# Patient Record
Sex: MALE | Race: BLACK OR AFRICAN AMERICAN | Employment: FULL TIME | URBAN - METROPOLITAN AREA
[De-identification: names, ages, dates, MRNs, and addresses within clinical notes are randomized per-mention and may not be internally consistent; named-entity substitution may affect disease eponyms.]

---

## 2017-02-25 PROCEDURE — 86706 HEP B SURFACE ANTIBODY: CPT | Performed by: INTERNAL MEDICINE

## 2017-02-25 PROCEDURE — 87591 N.GONORRHOEAE DNA AMP PROB: CPT | Performed by: INTERNAL MEDICINE

## 2017-02-25 PROCEDURE — 86703 HIV-1/HIV-2 1 RESULT ANTBDY: CPT | Performed by: INTERNAL MEDICINE

## 2017-02-25 PROCEDURE — 87491 CHLMYD TRACH DNA AMP PROBE: CPT | Performed by: INTERNAL MEDICINE

## 2017-02-25 PROCEDURE — 86803 HEPATITIS C AB TEST: CPT | Performed by: INTERNAL MEDICINE

## 2017-02-25 PROCEDURE — 87340 HEPATITIS B SURFACE AG IA: CPT | Performed by: INTERNAL MEDICINE

## 2018-03-31 PROCEDURE — 86803 HEPATITIS C AB TEST: CPT | Performed by: INTERNAL MEDICINE

## 2018-03-31 PROCEDURE — 87591 N.GONORRHOEAE DNA AMP PROB: CPT | Performed by: INTERNAL MEDICINE

## 2018-03-31 PROCEDURE — 87340 HEPATITIS B SURFACE AG IA: CPT | Performed by: INTERNAL MEDICINE

## 2018-03-31 PROCEDURE — 87491 CHLMYD TRACH DNA AMP PROBE: CPT | Performed by: INTERNAL MEDICINE

## 2018-03-31 PROCEDURE — 87389 HIV-1 AG W/HIV-1&-2 AB AG IA: CPT | Performed by: INTERNAL MEDICINE

## 2018-03-31 PROCEDURE — 86706 HEP B SURFACE ANTIBODY: CPT | Performed by: INTERNAL MEDICINE

## 2019-03-14 PROCEDURE — 87389 HIV-1 AG W/HIV-1&-2 AB AG IA: CPT | Performed by: INTERNAL MEDICINE

## 2019-03-14 PROCEDURE — 86803 HEPATITIS C AB TEST: CPT | Performed by: INTERNAL MEDICINE

## 2019-03-14 PROCEDURE — 86706 HEP B SURFACE ANTIBODY: CPT | Performed by: INTERNAL MEDICINE

## 2024-02-19 ENCOUNTER — HOSPITAL ENCOUNTER (INPATIENT)
Facility: HOSPITAL | Age: 44
LOS: 4 days | Discharge: HOME | End: 2024-02-23
Attending: HOSPITALIST | Admitting: HOSPITALIST
Payer: COMMERCIAL

## 2024-02-19 PROBLEM — K57.92 DIVERTICULITIS: Status: ACTIVE | Noted: 2024-02-19

## 2024-02-19 RX ORDER — HYDROMORPHONE HYDROCHLORIDE 1 MG/ML
0.4 INJECTION, SOLUTION INTRAMUSCULAR; INTRAVENOUS; SUBCUTANEOUS EVERY 2 HOUR PRN
Status: DISCONTINUED | OUTPATIENT
Start: 2024-02-19 | End: 2024-02-23

## 2024-02-19 RX ORDER — ONDANSETRON 2 MG/ML
4 INJECTION INTRAMUSCULAR; INTRAVENOUS EVERY 6 HOURS PRN
Status: DISCONTINUED | OUTPATIENT
Start: 2024-02-19 | End: 2024-02-23

## 2024-02-19 RX ORDER — HYDROMORPHONE HYDROCHLORIDE 1 MG/ML
0.2 INJECTION, SOLUTION INTRAMUSCULAR; INTRAVENOUS; SUBCUTANEOUS EVERY 2 HOUR PRN
Status: DISCONTINUED | OUTPATIENT
Start: 2024-02-19 | End: 2024-02-23

## 2024-02-19 RX ORDER — ENOXAPARIN SODIUM 100 MG/ML
40 INJECTION SUBCUTANEOUS DAILY
Status: DISCONTINUED | OUTPATIENT
Start: 2024-02-20 | End: 2024-02-20

## 2024-02-19 RX ORDER — PROCHLORPERAZINE EDISYLATE 5 MG/ML
5 INJECTION INTRAMUSCULAR; INTRAVENOUS EVERY 8 HOURS PRN
Status: DISCONTINUED | OUTPATIENT
Start: 2024-02-19 | End: 2024-02-23

## 2024-02-19 RX ORDER — SODIUM CHLORIDE 9 MG/ML
INJECTION, SOLUTION INTRAVENOUS CONTINUOUS
Status: DISCONTINUED | OUTPATIENT
Start: 2024-02-19 | End: 2024-02-23

## 2024-02-19 RX ORDER — HYDROMORPHONE HYDROCHLORIDE 1 MG/ML
0.8 INJECTION, SOLUTION INTRAMUSCULAR; INTRAVENOUS; SUBCUTANEOUS EVERY 2 HOUR PRN
Status: DISCONTINUED | OUTPATIENT
Start: 2024-02-19 | End: 2024-02-23

## 2024-02-19 RX ORDER — ACETAMINOPHEN 500 MG
500 TABLET ORAL EVERY 4 HOURS PRN
Status: DISCONTINUED | OUTPATIENT
Start: 2024-02-19 | End: 2024-02-23

## 2024-02-20 ENCOUNTER — APPOINTMENT (OUTPATIENT)
Dept: CT IMAGING | Facility: HOSPITAL | Age: 44
End: 2024-02-20
Attending: PHYSICIAN ASSISTANT
Payer: COMMERCIAL

## 2024-02-20 LAB
ANION GAP SERPL CALC-SCNC: 5 MMOL/L (ref 0–18)
BASOPHILS # BLD AUTO: 0.06 X10(3) UL (ref 0–0.2)
BASOPHILS NFR BLD AUTO: 0.3 %
BUN BLD-MCNC: 9 MG/DL (ref 9–23)
C DIFF TOX B STL QL: NEGATIVE
CALCIUM BLD-MCNC: 9.1 MG/DL (ref 8.5–10.1)
CHLORIDE SERPL-SCNC: 104 MMOL/L (ref 98–112)
CO2 SERPL-SCNC: 27 MMOL/L (ref 21–32)
CREAT BLD-MCNC: 1.22 MG/DL
EGFRCR SERPLBLD CKD-EPI 2021: 76 ML/MIN/1.73M2 (ref 60–?)
EOSINOPHIL # BLD AUTO: 0.13 X10(3) UL (ref 0–0.7)
EOSINOPHIL NFR BLD AUTO: 0.7 %
ERYTHROCYTE [DISTWIDTH] IN BLOOD BY AUTOMATED COUNT: 13 %
GLUCOSE BLD-MCNC: 92 MG/DL (ref 70–99)
HCT VFR BLD AUTO: 38.8 %
HGB BLD-MCNC: 12.3 G/DL
IMM GRANULOCYTES # BLD AUTO: 0.1 X10(3) UL (ref 0–1)
IMM GRANULOCYTES NFR BLD: 0.5 %
LYMPHOCYTES # BLD AUTO: 1.49 X10(3) UL (ref 1–4)
LYMPHOCYTES NFR BLD AUTO: 7.9 %
MCH RBC QN AUTO: 28.3 PG (ref 26–34)
MCHC RBC AUTO-ENTMCNC: 31.7 G/DL (ref 31–37)
MCV RBC AUTO: 89.2 FL
MONOCYTES # BLD AUTO: 1.96 X10(3) UL (ref 0.1–1)
MONOCYTES NFR BLD AUTO: 10.5 %
NEUTROPHILS # BLD AUTO: 15.01 X10 (3) UL (ref 1.5–7.7)
NEUTROPHILS # BLD AUTO: 15.01 X10(3) UL (ref 1.5–7.7)
NEUTROPHILS NFR BLD AUTO: 80.1 %
OSMOLALITY SERPL CALC.SUM OF ELEC: 280 MOSM/KG (ref 275–295)
PLATELET # BLD AUTO: 411 10(3)UL (ref 150–450)
POTASSIUM SERPL-SCNC: 3.7 MMOL/L (ref 3.5–5.1)
RBC # BLD AUTO: 4.35 X10(6)UL
SODIUM SERPL-SCNC: 136 MMOL/L (ref 136–145)
WBC # BLD AUTO: 18.8 X10(3) UL (ref 4–11)

## 2024-02-20 PROCEDURE — 80048 BASIC METABOLIC PNL TOTAL CA: CPT | Performed by: HOSPITALIST

## 2024-02-20 PROCEDURE — 74176 CT ABD & PELVIS W/O CONTRAST: CPT | Performed by: PHYSICIAN ASSISTANT

## 2024-02-20 PROCEDURE — 85025 COMPLETE CBC W/AUTO DIFF WBC: CPT | Performed by: HOSPITALIST

## 2024-02-20 PROCEDURE — 87493 C DIFF AMPLIFIED PROBE: CPT | Performed by: HOSPITALIST

## 2024-02-20 NOTE — PAYOR COMM NOTE
--------------  ADMISSION REVIEW     Payor: ANIYAOLIVE PANTOJA  Subscriber #:  [id number]  Authorization Number: [id number]    Admit date: 2/19/24  Admit time:  9:04 PM       H&P/Consult note   History of Present Illness: Patient is a 43 year old male with no sig pmhx here for abd pain  Ongoing for about 2 weeks. Lower abd, / llq, worse with movement. Has felt sweaty and fevers. Has had some urinary urgency as well  Went to [facility] yest and found to have diverticulitis    /78 (BP Location: Right arm)   Pulse 68   Temp 98.9 °F (37.2 °C) (Oral)   Resp 19   Wt 202 lb 4.8 oz (91.8 kg)   SpO2 96%   BMI 28.06 kg/m²       Head:  Normocephalic, without obvious abnormality, atraumatic.   Eyes:  Sclera anicteric, No conjunctival pallor, EOMs intact.    Throat: MMM   Neck: Supple, symmetrical, trachea midline   Lungs:   Clear to auscultation bilaterally    Chest wall:  No tenderness or deformity.   Heart:  Regular rate and rhythm, no peripheral edema   Abdomen:   Soft, ttp in lower abd / LLQ, no guarding or rebound     Radiology: CT AP at [facility] 02/20/24  IMPRESSION:     1. Complicated mid-sigmoid diverticulitis, including an intramural/pericolonic abscess at this   level, as detailed above.     2. Small simple appearing right renal cyst.      BOWEL: Multiple diverticula reside within the sigmoid colon. There is short segment nodular wall   thickening of the mid sigmoid with extensive surrounding fat stranding. A peripherally enhancing and   centrally hypoattenuating 5.4 x 3.4 x 2.9 cm lesion is seen abutting the anterior margin of the mid   sigmoid, suggesting an intramural or adjacent/pericolonic abscess (volume of 27.8 mL). The findings   are consistent with complicated sigmoid diverticulitis.         ASSESSMENT / PLAN:   Patient is a 43 year old male with no sig pmhx here for abd pain     Impression  -acute complicated sigmoid diverticulitis with abscess formation  -leukocytosis     -microscopic hematuria - pt aware and  need for fu  -possible pulm nodule seen on CXR at [facility] - pt aware and need for fu     Plan     -iv abx, npo  -surgical consult  -may need IR drainage, hold lovenox   -ivf     -outpt ct chest to eval pulm nodule  -repeat UA as outpt, if blood persists may need  eval     Scds    Report of Consultation         Reason for Consultation:  Surgical Consultation      History of Present Illness:  Jason Chong is a a(n) 43 year old male. Patient complains of abdominal pain that began about 2 weeks ago, he initially thought he had food poisoning. His pain improved the following day. He does report that though shortly thereafter localized LLQ pain began described as a pressure. He does report throughout this period he has had constipation, also feeling some pressure with urination   CT scan obtained at [facility] where it describes acute diverticulitis with abscess, noted to be intramural v pericolonic   Patient admitted and general surgery consulted  Patient reports no h/o diverticulitis in the past, no c scope in the past    SKIN: anicteric  RESPIRATORY: non labored breathing  CARDIOVASCULAR: RRR  ABDOMEN: soft, fullness noted in LLQ with moderate tenderness extending to midline  LYMPHATIC: no lymphadenopathy  EXTREMITIES: no cyanosis, clubbing or edema  Lab 02/20/24  0607   WBC 18.8*   HGB 12.3*   MCV 89.2   .0      Lab 02/20/24  0607      K 3.7      CO2 27.0   BUN 9   CREATSERUM 1.22   GLU 92   CA 9.1    Impression:     42 y/o with acute diverticulitis with abscess  CT scan as noted above   T max 100.6   Leukocytosis  Abdomen is soft, fullness with moderate tenderness in LLQ extending to midline, no peritonitis      Plan:     Will proceed with conservative management,   -will need to consider repeat CT to verify if abscess in intramural, if intramural will not be able to have IR drain placement, - will discuss with Dr Marshall  -recommended bowel rest, NPO ice chips ok   -IV fluids  -IV antibiotics  -IV pain  medication prn  -zofran prn  Consult dietician to discuss diet recommendations for review of low fiber diet for home        MEDICATIONS ADMINISTERED IN LAST 1 DAY:  HYDROmorphone (Dilaudid) 1 MG/ML injection 0.4 mg       Date Action Dose Route User    2/20/2024 0718 Given 0.4 mg Intravenous Yadi Davies RN    2/19/2024 2325 Given 0.4 mg Intravenous Laura Garcia RN          piperacillin-tazobactam (Zosyn) 3.375 g in dextrose 5% 100 mL IVPB-ADDV       Date Action Dose Route User    2/20/2024 0841 New Bag 3.375 g Intravenous ValenzJossy nath RN    2/19/2024 2324 New Bag 3.375 g Intravenous Laura Garcia RN          sodium chloride 0.9% infusion       Date Action Dose Route User    2/19/2024 2323 New Bag (none) Intravenous Laura Garcia RN            Vitals (last day)       Date/Time Temp Pulse Resp BP SpO2 Weight O2 Device O2 Flow Rate (L/min) [facility]    02/20/24 1200 98.3 °F (36.8 °C) 70 18 141/74 98 % -- None (Room air) --     02/20/24 0830 98.2 °F (36.8 °C) 66 20 152/77 100 % -- None (Room air) --     02/20/24 0600 98.9 °F (37.2 °C) 68 19 142/78 96 % 202 lb 4.8 oz None (Room air) 0 L/min     02/19/24 2300 99.8 °F (37.7 °C) 69 20 145/74 98 % -- None (Room air) 0 L/min     02/19/24 2130 100.6 °F (38.1 °C) 75 20 156/79 99 % -- None (Room air) 0 L/min NN

## 2024-02-20 NOTE — PROGRESS NOTES
NURSING ADMISSION NOTE      Patient admitted via Ambulatory  Oriented to room 3613  Safety precautions initiated.  Bed in low position.  Call light in reach.    Patient direct admit from [facility]. Admission navigator completed. Girlfriend at bedside. Updated on plan of care. Will page MD for admission orders

## 2024-02-20 NOTE — PLAN OF CARE
A&Ox4, girlfriend at bedside. On room air, lungs clear. Abdomen soft and tender, BS active. Denies N&V. C/o abdominal pain- PRN Dilaudid given with positive result. IVF infusing to R AC PIV- 0.9 NS @ 100 mL/hr. Receiving IV Zosyn q8h for diverticulitis. Gen Sx to see patient in the morning. Remains NPO. Up ad pravin. Voiding with out difficulty. Needs met. Will monitor.    Problem: Patient/Family Goals  Goal: Patient/Family Long Term Goal  Description: Patient's Long Term Goal: to discharge    Interventions:  -   - See additional Care Plan goals for specific interventions  2/20/2024 0151 by Yadi Davies, RN  Outcome: Progressing     Problem: Patient/Family Goals  Goal: Patient/Family Short Term Goal  Description: Patient's Short Term Goal: for the diverticulitis to resolve    Interventions:   - Gen Sx to see, IVF, IV abx  - See additional Care Plan goals for specific interventions  2/20/2024 0151 by Yadi Davies, RN  Outcome: Progressing     Problem: PAIN - ADULT  Goal: Verbalizes/displays adequate comfort level or patient's stated pain goal  Description: INTERVENTIONS:  - Encourage pt to monitor pain and request assistance  - Assess pain using appropriate pain scale  - Administer analgesics based on type and severity of pain and evaluate response  - Implement non-pharmacological measures as appropriate and evaluate response  - Consider cultural and social influences on pain and pain management  - Manage/alleviate anxiety  - Utilize distraction and/or relaxation techniques  - Monitor for opioid side effects  - Notify MD/LIP if interventions unsuccessful or patient reports new pain  - Anticipate increased pain with activity and pre-medicate as appropriate  2/20/2024 0151 by Yadi Davies, RN  Outcome: Progressing

## 2024-02-20 NOTE — CONSULTS
[facility]  Report of Consultation    Jason Chong Patient Status:  Inpatient    1980 MRN [id number]   Location [facility] 3NE-A Attending Albert Andino MD   Hosp Day # 1 PCP Sonja Soto MD     24    Reason for Consultation:    Surgical Consultation     CC: abdominal pain      History of Present Illness:    Jason Chong is a a(n) 43 year old male. Patient complains of abdominal pain that began about 2 weeks ago, he initially thought he had food poisoning. His pain improved the following day. He does report that though shortly thereafter localized LLQ pain began described as a pressure. He does report throughout this period he has had constipation, also feeling some pressure with urination   CT scan obtained at [facility] where it describes acute diverticulitis with abscess, noted to be intramural v pericolonic   Patient admitted and general surgery consulted  Patient reports no h/o diverticulitis in the past, no c scope in the past  Denies any previous abdominal surgery     Past Medical History:    History reviewed. No pertinent past medical history.    Past Surgical History:    History reviewed. No pertinent surgical history.    Family History:    History reviewed. No pertinent family history.    Social History:     reports that he has never smoked. He has never used smokeless tobacco. He reports current alcohol use. He reports that he does not use drugs.    Allergies:    No Known Allergies    Current Medications:      Current Facility-Administered Medications:     sodium chloride 0.9% infusion, , Intravenous, Continuous    acetaminophen (Tylenol Extra Strength) tab 500 mg, 500 mg, Oral, Q4H PRN    ondansetron (Zofran) 4 MG/2ML injection 4 mg, 4 mg, Intravenous, Q6H PRN    prochlorperazine (Compazine) 10 MG/2ML injection 5 mg, 5 mg, Intravenous, Q8H PRN    HYDROmorphone (Dilaudid) 1 MG/ML injection 0.2 mg, 0.2 mg, Intravenous, Q2H PRN **OR** HYDROmorphone (Dilaudid) 1 MG/ML injection 0.4  mg, 0.4 mg, Intravenous, Q2H PRN **OR** HYDROmorphone (Dilaudid) 1 MG/ML injection 0.8 mg, 0.8 mg, Intravenous, Q2H PRN    piperacillin-tazobactam (Zosyn) 3.375 g in dextrose 5% 100 mL IVPB-ADDV, 3.375 g, Intravenous, Q8H    Home Medications:    No current facility-administered medications on file prior to encounter.     No current outpatient medications on file prior to encounter.       Review of Systems:    10 point review performed pertinent positives and negatives per history of present illness.    Physical Exam:    Blood pressure 152/77, pulse 66, temperature 98.2 °F (36.8 °C), temperature source Oral, resp. rate 20, weight 202 lb 3.2 oz (91.7 kg), SpO2 100%.    GENERAL: Alert and oriented x 3, well developed, well nourished male, in no apparent distress    SKIN: anicteric    RESPIRATORY: non labored breathing    CARDIOVASCULAR: RRR    ABDOMEN: soft, fullness noted in LLQ with moderate tenderness extending to midline    LYMPHATIC: no lymphadenopathy    EXTREMITIES: no cyanosis, clubbing or edema    .    Laboratory Data:  Recent Labs   Lab 02/20/24  0607   WBC 18.8*   HGB 12.3*   MCV 89.2   .0       Recent Labs   Lab 02/20/24  0607      K 3.7      CO2 27.0   BUN 9   CREATSERUM 1.22   GLU 92   CA 9.1       No results for input(s): \"ALT\", \"AST\", \"ALB\", \"AMYLASE\", \"LIPASE\", \"LDH\" in the last 168 hours.    Invalid input(s): \"ALPHOS\", \"TBIL\", \"DBIL\", \"TPROT\"    No results for input(s): \"TROP\" in the last 168 hours.          Radiology:  Procedure Note    Abraham Lopez DO - 02/19/2024  Formatting of this note might be different from the original.  DATE OF SERVICE: 02.19.2024  CT ABDOMEN AND PELVIS WITH IV CONTRAST    CLINICAL INDICATION: None.    COMPARISON STUDY: None available.    TECHNIQUE:  Multiple axial images of the abdomen and pelvis were performed from the lung bases  through the pubic symphysis after the injection of nonionic intravenous contrast. Oral contrast was  used for the entire  exam.  80 mL of Isovue 370 were administered intravenously.    ADVERSE REACTION: None    Automated exposure control and ALARA manual techniques for patient specific dose reduction were  followed while maintaining the necessary diagnostic image quality.    FINDINGS:  LUNG BASES: No consolidations in the visualized lungs. No pleural/pericardial effusions.    LIVER: Homogeneous parenchyma without cystic or solid lesions. No intrahepatic biliary ductal  dilatation.    GALLBLADDER: Normally distended without wall thickening or pericholecystic fluid/fat stranding.    BILIARY TREE: No radiographic evidence of extrahepatic biliary ductal dilatation.    PANCREAS: No pancreatic ductal dilatation or peripancreatic fluid/fat stranding.    SPLEEN: Unremarkable.    ADRENAL GLANDS: Unremarkable.    KIDNEYS: Demonstrate symmetric and bilateral nephrograms without hydronephrosis or perinephric  collections.    A well-defined hypoattenuating and nonenhancing 1.3 cm midpole cortical lesion of the right kidney  is most likely a simple cyst (Bosniak class I cyst).    ABDOMINAL AORTA: Normal caliber without aneurysmal dilatations.    MESENTERY/RETROPERITONEUM: No enlarged mesenteric/retroperitoneal lymph nodes.    BOWEL: Multiple diverticula reside within the sigmoid colon. There is short segment nodular wall  thickening of the mid sigmoid with extensive surrounding fat stranding. A peripherally enhancing and  centrally hypoattenuating 5.4 x 3.4 x 2.9 cm lesion is seen abutting the anterior margin of the mid  sigmoid, suggesting an intramural or adjacent/pericolonic abscess (volume of 27.8 mL). The findings  are consistent with complicated sigmoid diverticulitis.    An approximately 18 x 9 mm fluid-containing structure posterior to the gastric fundus represents a  gastric diverticulum.    No radiographic evidence of bowel obstruction or other wall thickening.    There is no pneumoperitoneum or significant free fluid.    URINARY BLADDER:  No bladder wall thickening. No perivesical fat stranding.    PELVIC LYMPH NODES: No pelvic/inguinal lymphadenopathy.    PELVIS OTHER: No pelvic mass lesions or loculated collections are present.    OSSEOUS STRUCTURES: No significant focal osteolytic or osteoblastic lesions.    =====  IMPRESSION:    1. Complicated mid-sigmoid diverticulitis, including an intramural/pericolonic abscess at this  level, as detailed above.    2. Small simple appearing right renal cyst.  Exam End: 02/19/24 18:12    Specimen Collected: 02/19/24 18:14 Last Resulted: 02/19/24 18:19       Problem List:    Patient Active Problem List   Diagnosis    Diverticulitis       Impression:    42 y/o with acute diverticulitis with abscess  CT scan as noted above   T max 100.6   Leukocytosis  Abdomen is soft, fullness with moderate tenderness in LLQ extending to midline, no peritonitis     Plan:    Will proceed with conservative management,   -will need to consider repeat CT to verify if abscess in intramural, if intramural will not be able to have IR drain placement, - will discuss with Dr Marshall  -recommended bowel rest, NPO ice chips ok   -IV fluids  -IV antibiotics  -IV pain medication prn  -zofran prn  Consult dietician to discuss diet recommendations for review of low fiber diet for home      REMIGIO Cabral  [facility]  General Surgery  2/20/2024    Addendum  Agree as above  Patient was seen and examined with Bonita  Will obtain CT abdomen and pelvis with oral contrast to further evaluate the possible intramural versus pericolonic abscess.  If intramural abscess then will treat with 2 weeks of oral antibiotics but if pericolonic abscess then will consult IR for possible aspiration.  No acute surgical issues at this time  Ok to have clear liquid diet after the CT scan  Voiced understanding

## 2024-02-20 NOTE — PLAN OF CARE
Pt alert and oriented x 4   Assumed pt care at 0730  RA  Tele-NSR  PRN Dilaudid for pain  NPO   Gen/Surg consult   PIV Right AC with NS at 100ml/hr    Problem: PAIN - ADULT  Goal: Verbalizes/displays adequate comfort level or patient's stated pain goal  Description: INTERVENTIONS:  - Encourage pt to monitor pain and request assistance  - Assess pain using appropriate pain scale  - Administer analgesics based on type and severity of pain and evaluate response  - Implement non-pharmacological measures as appropriate and evaluate response  - Consider cultural and social influences on pain and pain management  - Manage/alleviate anxiety  - Utilize distraction and/or relaxation techniques  - Monitor for opioid side effects  - Notify MD/LIP if interventions unsuccessful or patient reports new pain  - Anticipate increased pain with activity and pre-medicate as appropriate  Outcome: Progressing     Problem: RISK FOR INFECTION - ADULT  Goal: Absence of fever/infection during anticipated neutropenic period  Description: INTERVENTIONS  - Monitor WBC  - Administer growth factors as ordered  - Implement neutropenic guidelines  Outcome: Progressing     Problem: DISCHARGE PLANNING  Goal: Discharge to home or other facility with appropriate resources  Description: INTERVENTIONS:  - Identify barriers to discharge w/pt and caregiver  - Include patient/family/discharge partner in discharge planning  - Arrange for needed discharge resources and transportation as appropriate  - Identify discharge learning needs (meds, wound care, etc)  - Arrange for interpreters to assist at discharge as needed  - Consider post-discharge preferences of patient/family/discharge partner  - Complete POLST form as appropriate  - Assess patient's ability to be responsible for managing their own health  - Refer to Case Management Department for coordinating discharge planning if the patient needs post-hospital services based on physician/LIP order or complex  needs related to functional status, cognitive ability or social support system  Outcome: Progressing

## 2024-02-20 NOTE — H&P
[facility] Hospitalist H&P/Consult note       CC: abd pain     PCP: Sonja Soto MD    History of Present Illness: Patient is a 43 year old male with no sig pmhx here for abd pain  Ongoing for about 2 weeks. Lower abd, / llq, worse with movement. Has felt sweaty and fevers. Has had some urinary urgency as well  Went to [facility] yest and found to have diverticulitis  No similar symptoms in the past         PMH  none    PSH  none    ALL:  No Known Allergies     Home Medications:  none        Soc Hx  Social History     Tobacco Use    Smoking status: Never    Smokeless tobacco: Never   Substance Use Topics    Alcohol use: Yes     Comment: 2-4 times per month        Fam Hx  none    Review of Systems  Comprehensive ROS reviewed and negative except for what's stated above.  Including negative for chest pain, shortness of breath, syncope.       OBJECTIVE:  /78 (BP Location: Right arm)   Pulse 68   Temp 98.9 °F (37.2 °C) (Oral)   Resp 19   Wt 202 lb 4.8 oz (91.8 kg)   SpO2 96%   BMI 28.06 kg/m²   General:  Alert, no distress, appears stated age.   Head:  Normocephalic, without obvious abnormality, atraumatic.   Eyes:  Sclera anicteric, No conjunctival pallor, EOMs intact.    Throat: MMM   Neck: Supple, symmetrical, trachea midline   Lungs:   Clear to auscultation bilaterally    Chest wall:  No tenderness or deformity.   Heart:  Regular rate and rhythm, no peripheral edema   Abdomen:   Soft, ttp in lower abd / LLQ, no guarding or rebound   Extremities: Atraumatic, no cyanosis or edema.   Skin: No visible rashes or lesions.    Neurologic: Normal strength, no focal deficit appreciated     Diagnostic Data:    CBC/Chem  No results for input(s): \"WBC\", \"HGB\", \"MCV\", \"PLT\", \"BAND\", \"INR\" in the last 168 hours.    Invalid input(s): \"LYM#\", \"MONO#\", \"BASOS#\", \"EOSIN#\"    No results for input(s): \"NA\", \"K\", \"CL\", \"CO2\", \"BUN\", \"CREATSERUM\", \"GLU\", \"CA\", \"CAION\", \"MG\", \"PHOS\" in the last 168 hours.    No results for input(s):  \"ALT\", \"AST\", \"ALB\", \"AMYLASE\", \"LIPASE\", \"LDH\" in the last 168 hours.    Invalid input(s): \"ALPHOS\", \"TBIL\", \"DBIL\", \"TPROT\"    No results for input(s): \"TROP\" in the last 168 hours.       Radiology: CT AP at [facility] 02/20/24  IMPRESSION:     1. Complicated mid-sigmoid diverticulitis, including an intramural/pericolonic abscess at this   level, as detailed above.     2. Small simple appearing right renal cyst.     BOWEL: Multiple diverticula reside within the sigmoid colon. There is short segment nodular wall   thickening of the mid sigmoid with extensive surrounding fat stranding. A peripherally enhancing and   centrally hypoattenuating 5.4 x 3.4 x 2.9 cm lesion is seen abutting the anterior margin of the mid   sigmoid, suggesting an intramural or adjacent/pericolonic abscess (volume of 27.8 mL). The findings   are consistent with complicated sigmoid diverticulitis.       ASSESSMENT / PLAN:     Patient is a 43 year old male with no sig pmhx here for abd pain    Impression    -acute complicated sigmoid diverticulitis with abscess formation  -leukocytosis    -microscopic hematuria - pt aware and need for fu  -possible pulm nodule seen on CXR at [facility] - pt aware and need for fu    Plan    -iv abx, npo  -surgical consult  -may need IR drainage, hold lovenox   -ivf    -outpt ct chest to eval pulm nodule  -repeat UA as outpt, if blood persists may need  eval    scds    Further recommendations pending patient's clinical course. [facility] hospitalist to continue to follow patient while in house    Patient and/or patient's family given opportunity to ask questions and note understanding and agreeing with therapeutic plan as outlined    Aron Gotti Hospitalist  [phone]  Answering Service: [phone]

## 2024-02-20 NOTE — PROGRESS NOTES
02/19/24 2240   Provider Notification   Reason for Communication New consult   Provider Name Other (comment)  (Natalie Marshall (Gen Sx))   Method of Communication Page   Response No new orders   Notification Time 2240

## 2024-02-21 LAB
ALBUMIN SERPL-MCNC: 2.8 G/DL (ref 3.4–5)
ALBUMIN/GLOB SERPL: 0.6 {RATIO} (ref 1–2)
ALP LIVER SERPL-CCNC: 67 U/L
ALT SERPL-CCNC: 15 U/L
ANION GAP SERPL CALC-SCNC: 5 MMOL/L (ref 0–18)
AST SERPL-CCNC: 7 U/L (ref 15–37)
BILIRUB SERPL-MCNC: 0.6 MG/DL (ref 0.1–2)
BILIRUB UR QL STRIP.AUTO: NEGATIVE
BUN BLD-MCNC: 8 MG/DL (ref 9–23)
CALCIUM BLD-MCNC: 8.8 MG/DL (ref 8.5–10.1)
CHLORIDE SERPL-SCNC: 106 MMOL/L (ref 98–112)
CLARITY UR REFRACT.AUTO: CLEAR
CO2 SERPL-SCNC: 27 MMOL/L (ref 21–32)
COLOR UR AUTO: YELLOW
CREAT BLD-MCNC: 1.14 MG/DL
EGFRCR SERPLBLD CKD-EPI 2021: 83 ML/MIN/1.73M2 (ref 60–?)
ERYTHROCYTE [DISTWIDTH] IN BLOOD BY AUTOMATED COUNT: 12.9 %
GLOBULIN PLAS-MCNC: 4.4 G/DL (ref 2.8–4.4)
GLUCOSE BLD-MCNC: 85 MG/DL (ref 70–99)
GLUCOSE UR STRIP.AUTO-MCNC: NORMAL MG/DL
HCT VFR BLD AUTO: 37.6 %
HGB BLD-MCNC: 12.5 G/DL
KETONES UR STRIP.AUTO-MCNC: 40 MG/DL
LEUKOCYTE ESTERASE UR QL STRIP.AUTO: NEGATIVE
MAGNESIUM SERPL-MCNC: 2.2 MG/DL (ref 1.6–2.6)
MCH RBC QN AUTO: 28.6 PG (ref 26–34)
MCHC RBC AUTO-ENTMCNC: 33.2 G/DL (ref 31–37)
MCV RBC AUTO: 86 FL
NITRITE UR QL STRIP.AUTO: NEGATIVE
OSMOLALITY SERPL CALC.SUM OF ELEC: 284 MOSM/KG (ref 275–295)
PH UR STRIP.AUTO: 6.5 [PH] (ref 5–8)
PLATELET # BLD AUTO: 410 10(3)UL (ref 150–450)
POTASSIUM SERPL-SCNC: 3.8 MMOL/L (ref 3.5–5.1)
PROT SERPL-MCNC: 7.2 G/DL (ref 6.4–8.2)
PROT UR STRIP.AUTO-MCNC: 30 MG/DL
RBC # BLD AUTO: 4.37 X10(6)UL
RBC #/AREA URNS AUTO: >10 /HPF
SODIUM SERPL-SCNC: 138 MMOL/L (ref 136–145)
SP GR UR STRIP.AUTO: 1.02 (ref 1–1.03)
UROBILINOGEN UR STRIP.AUTO-MCNC: 8 MG/DL
WBC # BLD AUTO: 17.9 X10(3) UL (ref 4–11)

## 2024-02-21 PROCEDURE — 83735 ASSAY OF MAGNESIUM: CPT | Performed by: HOSPITALIST

## 2024-02-21 PROCEDURE — 80053 COMPREHEN METABOLIC PANEL: CPT | Performed by: HOSPITALIST

## 2024-02-21 PROCEDURE — 85027 COMPLETE CBC AUTOMATED: CPT | Performed by: HOSPITALIST

## 2024-02-21 PROCEDURE — 81001 URINALYSIS AUTO W/SCOPE: CPT | Performed by: HOSPITALIST

## 2024-02-21 RX ORDER — HYDROCODONE BITARTRATE AND ACETAMINOPHEN 5; 325 MG/1; MG/1
1 TABLET ORAL EVERY 4 HOURS PRN
Status: DISCONTINUED | OUTPATIENT
Start: 2024-02-21 | End: 2024-02-23

## 2024-02-21 RX ORDER — ENOXAPARIN SODIUM 100 MG/ML
40 INJECTION SUBCUTANEOUS NIGHTLY
Status: DISCONTINUED | OUTPATIENT
Start: 2024-02-21 | End: 2024-02-23

## 2024-02-21 NOTE — PROGRESS NOTES
CC: follow-up hospital admission diverticulitis    SUBJECTIVE:  Interval History:     Pain is better  Tolerating diet    States he had hematuria earlier today, asked to show it to staff next time  Dw RN, states urine was yellow this am    OBJECTIVE:  Scheduled Meds:    piperacillin-tazobactam  3.375 g Intravenous Q8H     Continuous Infusions:    sodium chloride 100 mL/hr at 02/21/24 0600     PRN Meds: HYDROcodone-acetaminophen, acetaminophen, ondansetron, prochlorperazine, HYDROmorphone **OR** HYDROmorphone **OR** HYDROmorphone    PHYSICAL EXAM  Vital signs: Temp:  [98.7 °F (37.1 °C)-99.4 °F (37.4 °C)] 99 °F (37.2 °C)  Pulse:  [63-75] 64  Resp:  [16-20] 18  BP: (115-135)/(64-82) 115/64  SpO2:  [95 %-98 %] 95 %      GENERAL - NAD, AAO  EYES- sclera anicteric   HENT- normocephalic, OP - MMM  NECK - no JVD  CV- RRR  RESP - CTAB, normal resp effort  ABDOMEN- soft, ttp in left abd better  EXT- no LE edema   PSYCH - normal mentation/ normal affect    Data Review:   Labs:   Recent Labs   Lab 02/20/24  0607 02/21/24  0540   WBC 18.8* 17.9*   HGB 12.3* 12.5*   MCV 89.2 86.0   .0 410.0       Recent Labs   Lab 02/20/24  0607 02/21/24  0540    138   K 3.7 3.8    106   CO2 27.0 27.0   BUN 9 8*   CREATSERUM 1.22 1.14   CA 9.1 8.8   MG  --  2.2   GLU 92 85       Recent Labs   Lab 02/21/24  0540   ALT 15*   AST 7*   ALB 2.8*       No results for input(s): \"PGLU\" in the last 168 hours.        ASSESSMENT/PLAN:    Patient is a 43 year old male with no sig pmhx here for abd pain     Impression     -acute complicated sigmoid diverticulitis with abscess formation  -leukocytosis     -microscopic hematuria - pt aware and need for fu  -possible pulm nodule seen on CXR at [facility] - pt aware and need for fu     Plan     -iv abx, IVF  -surgical consult  -dw GS, abscess appears intramural, unable to drain  -started on CLD  Add norco   repeat UA here    -outpt ct chest to eval pulm nodule  -repeat UA as outpt, if blood persists  may need  eval     Scds        Will continue to follow while hospitalized. Please page me or the on-call hospitalist with questions or concerns.    Aron Michaud Hospitalist  [phone]  Answering Service: [phone]

## 2024-02-21 NOTE — PROGRESS NOTES
[facility]  Progress Note    Jason Chong Patient Status:  Inpatient    1980 MRN [id number]   Location [facility] 3NE-A Attending Aron Holcomb,    Hosp Day # 2 PCP Sonja Soto MD     Subjective:    Patient reports feeling improved pain   CT scan reviewed with patient diverticulitis of sigmoid colon with phlegmon     Objective/Physical Exam:    Vital Signs:  Blood pressure 115/64, pulse 64, temperature 99 °F (37.2 °C), temperature source Oral, resp. rate 18, weight 202 lb 3.2 oz (91.7 kg), SpO2 95%.    General:  Alert, orientated x3.  Cooperative.  No apparent distress.    Lungs:  Non labored breathing    Cardiac:  Regular rate and rhythm.     Abdomen:  soft, fullness in LLQ with mild tenderness improved from yesterday     Extremities:  No lower extremity edema noted.  Without clubbing or cyanosis.        Labs:  Reviewed    Lab Results   Component Value Date    WBC 17.9 2024    HGB 12.5 2024    HCT 37.6 2024    .0 2024    CREATSERUM 1.14 2024    BUN 8 2024     2024    K 3.8 2024     2024    CO2 27.0 2024    GLU 85 2024    CA 8.8 2024    ALB 2.8 2024    ALKPHO 67 2024    BILT 0.6 2024    TP 7.2 2024    AST 7 2024    ALT 15 2024    MG 2.2 2024       Xray:  Reviewed    Problem List:  Patient Active Problem List   Diagnosis    Diverticulitis           Impression:     42 y/o with acute diverticulitis   Leukocytosis  Mild improvement in LLQ pain    Plan:    Will start clears today  Encourage ambulation/IS  Continue IV antibiotics  All questions answered  Repeat labs tomorrow  Patient seen and examined with REMIGIO Guallpa  [place]  General Surgery  2024

## 2024-02-21 NOTE — DIETARY NOTE
[facility]   CLINICAL NUTRITION    Jason Cohng     Admitting diagnosis:  Diverticulitis with abscess  Diverticulitis    Ht:  5'11\"  Wt: 91.7 kg (202 lb 3.2 oz).   Body mass index is 28.04 kg/m².  IBW: 78.2 kg    Wt Readings from Last 6 Encounters:   02/20/24 91.7 kg (202 lb 3.2 oz)   03/19/22 99.5 kg (219 lb 6.4 oz)   04/10/21 94.3 kg (208 lb)   06/06/20 98.4 kg (217 lb)   03/30/20 100.2 kg (221 lb)   03/14/19 93.9 kg (207 lb)        Labs/Meds reviewed    Diet:       Procedures    Clear liquid diet Is Patient on Accuchecks? No     Percent Meals Eaten (last 3 days)       None            Pt chart reviewed d/t MD Consult.  Patient reports fair appetite at this time.  No intake yet this admission - pt has been NPO  No current diarrhea, emesis, or constipation.   No significant weight changes noted.     PMH is noncontributory.  Pt seen per MD consult for Diverticulitis Diet. Pt NPO at time of visit. + Abdominal pain, thirsty.  No n/v.  Reviewed Diverticulitis diet including low fiber advancing eventually to high fiber.  All questions answered at time of visit.  Handout with contact info provided for questions post discharge.    Patient is at low nutrition risk at this time.    Please consult if patient status changes or nutrition issues arise.    Yadi Paniagua RD, LDN, [facility]  Clinical Dietitian  Phone [phone]

## 2024-02-21 NOTE — PLAN OF CARE
A&Ox4. On room air. Abdomen soft and nontender, BS active. Denies N&V. C/o abdominal pain- PRN Dilaudid given with positive result. IVF infusing to R AC PIV- 0.9 NS @ 100 mL/hr. Receiving IV Zosyn q8h for diverticulitis. Gen Sx following case. Up ad pravin. Voiding with out difficulty. Needs met. Will monitor     Problem: Patient/Family Goals  Goal: Patient/Family Long Term Goal  Description: Patient's Long Term Goal: to discharge    Interventions:  -   - See additional Care Plan goals for specific interventions  Outcome: Progressing  Goal: Patient/Family Short Term Goal  Description: Patient's Short Term Goal: for the diverticulitis to resolve    Interventions:   - Gen Sx to see, IVF, IV abx  - See additional Care Plan goals for specific interventions  Outcome: Progressing     Problem: PAIN - ADULT  Goal: Verbalizes/displays adequate comfort level or patient's stated pain goal  Description: INTERVENTIONS:  - Encourage pt to monitor pain and request assistance  - Assess pain using appropriate pain scale  - Administer analgesics based on type and severity of pain and evaluate response  - Implement non-pharmacological measures as appropriate and evaluate response  - Consider cultural and social influences on pain and pain management  - Manage/alleviate anxiety  - Utilize distraction and/or relaxation techniques  - Monitor for opioid side effects  - Notify MD/LIP if interventions unsuccessful or patient reports new pain  - Anticipate increased pain with activity and pre-medicate as appropriate  Outcome: Progressing     Problem: RISK FOR INFECTION - ADULT  Goal: Absence of fever/infection during anticipated neutropenic period  Description: INTERVENTIONS  - Monitor WBC  - Administer growth factors as ordered  - Implement neutropenic guidelines  Outcome: Progressing     Problem: DISCHARGE PLANNING  Goal: Discharge to home or other facility with appropriate resources  Description: INTERVENTIONS:  - Identify barriers to  discharge w/pt and caregiver  - Include patient/family/discharge partner in discharge planning  - Arrange for needed discharge resources and transportation as appropriate  - Identify discharge learning needs (meds, wound care, etc)  - Arrange for interpreters to assist at discharge as needed  - Consider post-discharge preferences of patient/family/discharge partner  - Complete POLST form as appropriate  - Assess patient's ability to be responsible for managing their own health  - Refer to Case Management Department for coordinating discharge planning if the patient needs post-hospital services based on physician/LIP order or complex needs related to functional status, cognitive ability or social support system  Outcome: Progressing

## 2024-02-21 NOTE — PLAN OF CARE
Patient alert and oriented x4.  On RA.  NSR on tele.  HR 70s.  Denies pain at this time.  IVF.  IV zosyn.  NPO at this time.  Resting comfortably in bed.

## 2024-02-22 LAB
ALBUMIN SERPL-MCNC: 2.6 G/DL (ref 3.4–5)
ALBUMIN/GLOB SERPL: 0.6 {RATIO} (ref 1–2)
ALP LIVER SERPL-CCNC: 55 U/L
ALT SERPL-CCNC: 15 U/L
ANION GAP SERPL CALC-SCNC: 5 MMOL/L (ref 0–18)
AST SERPL-CCNC: 14 U/L (ref 15–37)
BILIRUB SERPL-MCNC: 0.5 MG/DL (ref 0.1–2)
BUN BLD-MCNC: 5 MG/DL (ref 9–23)
CALCIUM BLD-MCNC: 8.8 MG/DL (ref 8.5–10.1)
CHLORIDE SERPL-SCNC: 107 MMOL/L (ref 98–112)
CO2 SERPL-SCNC: 27 MMOL/L (ref 21–32)
CREAT BLD-MCNC: 0.9 MG/DL
EGFRCR SERPLBLD CKD-EPI 2021: 110 ML/MIN/1.73M2 (ref 60–?)
ERYTHROCYTE [DISTWIDTH] IN BLOOD BY AUTOMATED COUNT: 12.8 %
GLOBULIN PLAS-MCNC: 4.3 G/DL (ref 2.8–4.4)
GLUCOSE BLD-MCNC: 96 MG/DL (ref 70–99)
HCT VFR BLD AUTO: 36.6 %
HGB BLD-MCNC: 12.4 G/DL
MAGNESIUM SERPL-MCNC: 2.1 MG/DL (ref 1.6–2.6)
MCH RBC QN AUTO: 28.8 PG (ref 26–34)
MCHC RBC AUTO-ENTMCNC: 33.9 G/DL (ref 31–37)
MCV RBC AUTO: 85.1 FL
OSMOLALITY SERPL CALC.SUM OF ELEC: 285 MOSM/KG (ref 275–295)
PLATELET # BLD AUTO: 420 10(3)UL (ref 150–450)
POTASSIUM SERPL-SCNC: 3.7 MMOL/L (ref 3.5–5.1)
PROT SERPL-MCNC: 6.9 G/DL (ref 6.4–8.2)
RBC # BLD AUTO: 4.3 X10(6)UL
SODIUM SERPL-SCNC: 139 MMOL/L (ref 136–145)
WBC # BLD AUTO: 14.7 X10(3) UL (ref 4–11)

## 2024-02-22 PROCEDURE — 80053 COMPREHEN METABOLIC PANEL: CPT | Performed by: HOSPITALIST

## 2024-02-22 PROCEDURE — 85027 COMPLETE CBC AUTOMATED: CPT | Performed by: HOSPITALIST

## 2024-02-22 PROCEDURE — 83735 ASSAY OF MAGNESIUM: CPT | Performed by: HOSPITALIST

## 2024-02-22 NOTE — PLAN OF CARE
Pt A&Ox4 Room Air  Resting in bed  NSR on Tele  Denies pain at this time  Lovenox Refused  IVF infusing 0.9@100ml/Hr.  IV Abx Q8 (Zosyn)  Safety precaution maintained  Plan of Care ongoing     Problem: Patient/Family Goals  Goal: Patient/Family Long Term Goal  Description: Patient's Long Term Goal: to discharge    Interventions:  -   - See additional Care Plan goals for specific interventions  Outcome: Progressing  Goal: Patient/Family Short Term Goal  Description: Patient's Short Term Goal: for the diverticulitis to resolve    Interventions:   - Gen Sx to see, IVF, IV abx  - See additional Care Plan goals for specific interventions  Outcome: Progressing     Problem: PAIN - ADULT  Goal: Verbalizes/displays adequate comfort level or patient's stated pain goal  Description: INTERVENTIONS:  - Encourage pt to monitor pain and request assistance  - Assess pain using appropriate pain scale  - Administer analgesics based on type and severity of pain and evaluate response  - Implement non-pharmacological measures as appropriate and evaluate response  - Consider cultural and social influences on pain and pain management  - Manage/alleviate anxiety  - Utilize distraction and/or relaxation techniques  - Monitor for opioid side effects  - Notify MD/LIP if interventions unsuccessful or patient reports new pain  - Anticipate increased pain with activity and pre-medicate as appropriate  Outcome: Progressing     Problem: RISK FOR INFECTION - ADULT  Goal: Absence of fever/infection during anticipated neutropenic period  Description: INTERVENTIONS  - Monitor WBC  - Administer growth factors as ordered  - Implement neutropenic guidelines  Outcome: Progressing     Problem: DISCHARGE PLANNING  Goal: Discharge to home or other facility with appropriate resources  Description: INTERVENTIONS:  - Identify barriers to discharge w/pt and caregiver  - Include patient/family/discharge partner in discharge planning  - Arrange for needed  discharge resources and transportation as appropriate  - Identify discharge learning needs (meds, wound care, etc)  - Arrange for interpreters to assist at discharge as needed  - Consider post-discharge preferences of patient/family/discharge partner  - Complete POLST form as appropriate  - Assess patient's ability to be responsible for managing their own health  - Refer to Case Management Department for coordinating discharge planning if the patient needs post-hospital services based on physician/LIP order or complex needs related to functional status, cognitive ability or social support system  Outcome: Progressing

## 2024-02-22 NOTE — PLAN OF CARE
[facility] care 0730.  Alert and oriented x4.  RA  Tele- NSR.   Lovenox for VTE prophylaxis  CLD.  PIV infusing 0.9 @ 100 ml/hr  Afebrile.  SCDS.  IV Zosyn q 8.  Electrolyte NC protocol.   Up at pravin.  Continue to monitor pt.   1512: MD paged regarding  pt reporting blood in toilet after urination. Pt flushed without RN seeing. No new orders. RN encouraged use of urinal so we can keep an eye on it.   1600: MD paged again. Pt reported urgency no blood noted in the urinal at this time.  MD paged. No new Orders.   1627: No blood in urinal during this urination but tinge of blood on toilet paper. Continue to monitor    Problem: PAIN - ADULT  Goal: Verbalizes/displays adequate comfort level or patient's stated pain goal  Description: INTERVENTIONS:  - Encourage pt to monitor pain and request assistance  - Assess pain using appropriate pain scale  - Administer analgesics based on type and severity of pain and evaluate response  - Implement non-pharmacological measures as appropriate and evaluate response  - Consider cultural and social influences on pain and pain management  - Manage/alleviate anxiety  - Utilize distraction and/or relaxation techniques  - Monitor for opioid side effects  - Notify MD/LIP if interventions unsuccessful or patient reports new pain  - Anticipate increased pain with activity and pre-medicate as appropriate  Outcome: Progressing

## 2024-02-22 NOTE — PROGRESS NOTES
[facility]  Progress Note    Jason Chong Patient Status:  Inpatient    1980 MRN [id number]   Location [facility] 3NE-A Attending Osbaldo Cordova MD   Hosp Day # 3 PCP Sonja Soto MD     Subjective:    Patient reports pain is improved though continues to have fullness  Liquid BM, + flatus     Objective/Physical Exam:    Vital Signs:  Blood pressure 120/71, pulse 64, temperature 98.2 °F (36.8 °C), temperature source Oral, resp. rate 18, weight 202 lb 3.2 oz (91.7 kg), SpO2 97%.    General:  Alert, orientated x3.  Cooperative.  No apparent distress.    Lungs:  Non labored breathing    Cardiac:  Regular rate and rhythm.     Abdomen:  soft, improved tenderness, fullness though mild improvement     Extremities:  No lower extremity edema noted.  Without clubbing or cyanosis.        Labs:  Reviewed    Lab Results   Component Value Date    WBC 14.7 2024    HGB 12.4 2024    HCT 36.6 2024    .0 2024    CREATSERUM 0.90 2024    BUN 5 2024     2024    K 3.7 2024     2024    CO2 27.0 2024    GLU 96 2024    CA 8.8 2024    ALB 2.6 2024    ALKPHO 55 2024    BILT 0.5 2024    TP 6.9 2024    AST 14 2024    ALT 15 2024    MG 2.1 2024     Problem List:  Patient Active Problem List   Diagnosis    Diverticulitis       Impression:     42 y/o with acute diverticulitis with phlegmon  Leukocytosis trended down  Tolerating clears   Improved pain     Plan:    Reviewed and encourage ambulation/IS  Full liquids today  Continue IV antibiotics  Hep lock IV fluids  Reviewed low fiber diet for home, possible advance to low fiber tomorrow and consider dc tomorrow   All questions answered      REMIGIO Cabral  [facility] Surgery  2024

## 2024-02-22 NOTE — DISCHARGE SUMMARY
General Medicine Discharge Summary     Patient ID:  Jason Chong  43 year old  12/5/1980    Admit date: 2/19/2024    Discharge date and time: No discharge date for patient encounter.     Attending Physician: Osbaldo Cordova MD     Primary Care Physician: Sonja Soto MD     Discharge Diagnoses: Diverticulitis with abscess  Hematuria  Pulmonary nodule    Primary Diagnosis at discharge from Hospital: Other: acute diverticulitis w absces; still recommend for TCM follow-up    Please note that only I[facility] DMG and EMG patients enrolled in the Medicare ACO, [facility] ACO and [facility] HMOs will be handled by the [facility] Care Management team.  For all other patients, please follow usual protocol for discharge care transition.    Secondary Diagnoses: None    Risk of readmission: Jason Chong has Moderate Risk of readmission after discharge from the hospital.    Discharge Condition: stable    Disposition: home    Important Follow up:  - PCP within   - Consults:     No follow-up provider specified.  - Labs: none  - Radiology: none    Hospital Course:      Pt admitted w divertuclitis w abscess formation.  Too small to drain-intramural.  Improved w IV abx, plan dc home w po abx    Also w intermittent mild hematuria- plan repeat UA 2 weeks. Fu  if persists.      Counseled on low fiber diet.        Consults: IP CONSULT TO GENERAL SURGERY  IP CONSULT TO FOOD AND NUTRITION SERVICES    Operative Procedures:        Patient instructions:      There are no discharge medications for this patient.      I PERSONALLY RECONCILED CURRENT AND DISCHARGE MEDICATIONS ON DAY OF DISCHARGE      Activity: activity as tolerated  Diet:  low fiber   Wound Care: as directed  Code Status: No Order      Exam on day of discharge:     Vitals:    02/23/24 0830   BP: 132/80   Pulse: 58   Resp: 18   Temp: 98.1 °F (36.7 °C)       General: no acute distress, alert and oriented x 3  Heart: RRR  Lungs: clear  bilaterally, no active wheezing  Abdomen: nontender, nondistended, intact BS  Extremities: no pedal edema   Neuro: CN inact, no focal deficits      Total time coordinating care for discharge: Greater than 30 minutes    .Nito Cordova  [facility] Hospitalist  [phone]

## 2024-02-23 VITALS
WEIGHT: 202.19 LBS | SYSTOLIC BLOOD PRESSURE: 145 MMHG | RESPIRATION RATE: 18 BRPM | TEMPERATURE: 99 F | OXYGEN SATURATION: 99 % | HEART RATE: 56 BPM | DIASTOLIC BLOOD PRESSURE: 98 MMHG | BODY MASS INDEX: 28 KG/M2

## 2024-02-23 LAB
ALBUMIN SERPL-MCNC: 2.7 G/DL (ref 3.4–5)
ALBUMIN/GLOB SERPL: 0.6 {RATIO} (ref 1–2)
ALP LIVER SERPL-CCNC: 52 U/L
ALT SERPL-CCNC: 15 U/L
ANION GAP SERPL CALC-SCNC: 2 MMOL/L (ref 0–18)
AST SERPL-CCNC: 15 U/L (ref 15–37)
BILIRUB SERPL-MCNC: 0.5 MG/DL (ref 0.1–2)
BUN BLD-MCNC: 4 MG/DL (ref 9–23)
CALCIUM BLD-MCNC: 9 MG/DL (ref 8.5–10.1)
CHLORIDE SERPL-SCNC: 107 MMOL/L (ref 98–112)
CO2 SERPL-SCNC: 27 MMOL/L (ref 21–32)
CREAT BLD-MCNC: 0.95 MG/DL
EGFRCR SERPLBLD CKD-EPI 2021: 103 ML/MIN/1.73M2 (ref 60–?)
ERYTHROCYTE [DISTWIDTH] IN BLOOD BY AUTOMATED COUNT: 12.5 %
GLOBULIN PLAS-MCNC: 4.3 G/DL (ref 2.8–4.4)
GLUCOSE BLD-MCNC: 94 MG/DL (ref 70–99)
HCT VFR BLD AUTO: 38.3 %
HGB BLD-MCNC: 12.4 G/DL
MAGNESIUM SERPL-MCNC: 2.1 MG/DL (ref 1.6–2.6)
MCH RBC QN AUTO: 28.4 PG (ref 26–34)
MCHC RBC AUTO-ENTMCNC: 32.4 G/DL (ref 31–37)
MCV RBC AUTO: 87.6 FL
OSMOLALITY SERPL CALC.SUM OF ELEC: 279 MOSM/KG (ref 275–295)
PLATELET # BLD AUTO: 468 10(3)UL (ref 150–450)
POTASSIUM SERPL-SCNC: 3.8 MMOL/L (ref 3.5–5.1)
PROT SERPL-MCNC: 7 G/DL (ref 6.4–8.2)
RBC # BLD AUTO: 4.37 X10(6)UL
SODIUM SERPL-SCNC: 136 MMOL/L (ref 136–145)
WBC # BLD AUTO: 12.4 X10(3) UL (ref 4–11)

## 2024-02-23 PROCEDURE — 80053 COMPREHEN METABOLIC PANEL: CPT | Performed by: HOSPITALIST

## 2024-02-23 PROCEDURE — 83735 ASSAY OF MAGNESIUM: CPT | Performed by: HOSPITALIST

## 2024-02-23 PROCEDURE — 85027 COMPLETE CBC AUTOMATED: CPT | Performed by: HOSPITALIST

## 2024-02-23 RX ORDER — AMOXICILLIN AND CLAVULANATE POTASSIUM 875; 125 MG/1; MG/1
1 TABLET, FILM COATED ORAL 2 TIMES DAILY
Qty: 20 TABLET | Refills: 0 | Status: SHIPPED | OUTPATIENT
Start: 2024-02-23 | End: 2024-03-04

## 2024-02-23 NOTE — PROGRESS NOTES
[facility]  Progress Note    Jason Chong Patient Status:  Inpatient    1980 MRN [id number]   Location [facility] 3NE-A Attending Osbaldo Cordova MD   Hosp Day # 4 PCP Sonja Soto MD     Subjective:    Patient is tolerating full liquids  Pain is improved, he feels some discomfort though the acute pain has resolved     Objective/Physical Exam:    Vital Signs:  Blood pressure 132/80, pulse 58, temperature 98.1 °F (36.7 °C), temperature source Oral, resp. rate 18, weight 202 lb 3.2 oz (91.7 kg), SpO2 97%.    General:  Alert, orientated x3.  Cooperative.  No apparent distress.    Lungs:  Non labored breathing    Cardiac:  Regular rate and rhythm.     Abdomen:  soft, improved tenderness and fullness, no guarding     Extremities:  No lower extremity edema noted.  Without clubbing or cyanosis.        Labs:  Reviewed    Lab Results   Component Value Date    WBC 12.4 2024    HGB 12.4 2024    HCT 38.3 2024    .0 2024    CREATSERUM 0.95 2024    BUN 4 2024     2024    K 3.8 2024     2024    CO2 27.0 2024    GLU 94 2024    CA 9.0 2024    ALB 2.7 2024    ALKPHO 52 2024    BILT 0.5 2024    TP 7.0 2024    AST 15 2024    ALT 15 2024    MG 2.1 2024       Problem List:  Patient Active Problem List   Diagnosis    Diverticulitis       Impression:     44 y/o with acute diverticulitis  Tolerating full liquids  Afebrile  Improved leukocytosis    Plan:    Reviewed low fiber diet recs for home  Encourage ambulation/IS  IV antibiotics and transition to PO for home  If tolerating diet ok for dc home  F/u in office with Dr Ochoa in 7-10 days  All questions answered      REMIGIO Cabral  [facility] Surgery  2024

## 2024-02-23 NOTE — PROGRESS NOTES
CC: follow-up hospital admission diverticulitis    SUBJECTIVE:  Interval History:     Still w some abd pain though much improved.      OBJECTIVE:  Scheduled Meds:    enoxaparin  40 mg Subcutaneous Nightly    piperacillin-tazobactam  3.375 g Intravenous Q8H     Continuous Infusions:    sodium chloride 100 mL/hr at 02/23/24 0054     PRN Meds: HYDROcodone-acetaminophen, acetaminophen, ondansetron, prochlorperazine, HYDROmorphone **OR** HYDROmorphone **OR** HYDROmorphone    PHYSICAL EXAM  Vital signs: Temp:  [97.6 °F (36.4 °C)-99.3 °F (37.4 °C)] 98.1 °F (36.7 °C)  Pulse:  [58-68] 58  Resp:  [18] 18  BP: (115-138)/(63-99) 132/80  SpO2:  [97 %-99 %] 97 %      GENERAL - NAD, AAO  EYES- sclera anicteric   HENT- normocephalic, OP - MMM  NECK - no JVD  CV- RRR  RESP - CTAB, normal resp effort  ABDOMEN- soft, ttp in left abd better  EXT- no LE edema   PSYCH - normal mentation/ normal affect    Data Review:   Labs:   Recent Labs   Lab 02/20/24  0607 02/21/24  0540 02/22/24  0756 02/23/24  0819   WBC 18.8* 17.9* 14.7* 12.4*   HGB 12.3* 12.5* 12.4* 12.4*   MCV 89.2 86.0 85.1 87.6   .0 410.0 420.0 468.0*       Recent Labs   Lab 02/20/24  0607 02/21/24  0540 02/22/24  0756 02/23/24  0819    138 139 136   K 3.7 3.8 3.7 3.8    106 107 107   CO2 27.0 27.0 27.0 27.0   BUN 9 8* 5* 4*   CREATSERUM 1.22 1.14 0.90 0.95   CA 9.1 8.8 8.8 9.0   MG  --  2.2 2.1 2.1   GLU 92 85 96 94       Recent Labs   Lab 02/21/24  0540 02/22/24  0756 02/23/24  0819   ALT 15* 15* 15*   AST 7* 14* 15   ALB 2.8* 2.6* 2.7*       No results for input(s): \"PGLU\" in the last 168 hours.        ASSESSMENT/PLAN:    Patient is a 43 year old male with no sig pmhx here for abd pain     Impression     -acute complicated sigmoid diverticulitis with abscess formation  -leukocytosis     -microscopic hematuria - pt aware and need for fu  -possible pulm nodule seen on CXR at [facility] - pt aware and need for fu     Plan     -iv abx, IVF  -surgical consult  -dw  GS, abscess appears intramural, unable to drain  - advance to fulls   - cont prn nroco    -outpt ct chest to eval pulm nodule  -repeat UA as outpt, if blood persists may need  eval     Scds        Will continue to follow while hospitalized. Please page me or the on-call hospitalist with questions or concerns.        Nito Cordova  [facility]  [facility]  Answering Service number: [phone]

## 2024-02-23 NOTE — DISCHARGE INSTRUCTIONS
Follow up urinalysis in 2 weeks for blood in urine with PCP; If persistent follow up with urology     Antibiotics as prescribed  Low fiber diet  F/u with Dr Ochoa in 7-10 days

## 2024-02-23 NOTE — PLAN OF CARE
[facility] care 0730.  Alert and oriented x4.  IV zosyn q8.  Full liquid diet  RA  Denies pain.   Electrolyte Protocol.   Denies blood in urine this am.   Continue to monitor pt.       Problem: PAIN - ADULT  Goal: Verbalizes/displays adequate comfort level or patient's stated pain goal  Description: INTERVENTIONS:  - Encourage pt to monitor pain and request assistance  - Assess pain using appropriate pain scale  - Administer analgesics based on type and severity of pain and evaluate response  - Implement non-pharmacological measures as appropriate and evaluate response  - Consider cultural and social influences on pain and pain management  - Manage/alleviate anxiety  - Utilize distraction and/or relaxation techniques  - Monitor for opioid side effects  - Notify MD/LIP if interventions unsuccessful or patient reports new pain  - Anticipate increased pain with activity and pre-medicate as appropriate  Outcome: Progressing

## 2024-02-23 NOTE — PROGRESS NOTES
NURSING DISCHARGE NOTE    Discharged Home via Ambulatory.  Accompanied by Support staff  Belongings Taken by patient/family.

## 2024-02-23 NOTE — PLAN OF CARE
A/O x 4  Room air  Low grade fever declined tylenol   Tele NSR  Denies pain  Tolerating diet well   IV Zosyn and IVF per MAR  Ref lovenox but has on his SCDs  Slight hematuria. No complaints about this tonight  Up ad pravin  All needs met at this time    Problem: Patient/Family Goals  Goal: Patient/Family Long Term Goal  Description: Patient's Long Term Goal: to discharge    Interventions:  -   - See additional Care Plan goals for specific interventions  Outcome: Progressing  Goal: Patient/Family Short Term Goal  Description: Patient's Short Term Goal: for the diverticulitis to resolve    Interventions:   - Gen Sx to see, IVF, IV abx  - See additional Care Plan goals for specific interventions  Outcome: Progressing     Problem: PAIN - ADULT  Goal: Verbalizes/displays adequate comfort level or patient's stated pain goal  Description: INTERVENTIONS:  - Encourage pt to monitor pain and request assistance  - Assess pain using appropriate pain scale  - Administer analgesics based on type and severity of pain and evaluate response  - Implement non-pharmacological measures as appropriate and evaluate response  - Consider cultural and social influences on pain and pain management  - Manage/alleviate anxiety  - Utilize distraction and/or relaxation techniques  - Monitor for opioid side effects  - Notify MD/LIP if interventions unsuccessful or patient reports new pain  - Anticipate increased pain with activity and pre-medicate as appropriate  Outcome: Progressing     Problem: RISK FOR INFECTION - ADULT  Goal: Absence of fever/infection during anticipated neutropenic period  Description: INTERVENTIONS  - Monitor WBC  - Administer growth factors as ordered  - Implement neutropenic guidelines  Outcome: Progressing     Problem: DISCHARGE PLANNING  Goal: Discharge to home or other facility with appropriate resources  Description: INTERVENTIONS:  - Identify barriers to discharge w/pt and caregiver  - Include patient/family/discharge  Discussion/Summary  Advocate Discussion Summary Free Text: results on home vm      Verified Results  CBC WITH AUTOMATED DIFFERENTIAL [id number] 11:40AM NIKOLAI PACHECO   [Aug 20, 2018 7:59AM NIKOLAI PACHECO]  Notify the patient that all of his lab work was completely normal which is excellent. Okay to provide copy if patient desires     Test Name Result Flag Reference   WHITE BLOOD COUNT 7.2 K/mcL  4.2-11.0   RED CELL COUNT 4.90 mil/mcL  4.50-5.90   HEMOGLOBIN 15.4 g/dl  13.0-17.0   HEMATOCRIT 45.8 %  39.0-51.0   MEAN CORPUSCULAR VOLUME 93.5 fL  78.0-100.0   MEAN CORPUSCULAR HEMOGLOBIN 31.4 pg  26.0-34.0   MEAN CORPUSCULAR HGB CONC 33.6 g/dl  32.0-36.5   RDW-CV 12.6 %  11.0-15.0   PLATELET COUNT 296 K/mcL  140-450   SHYLA% 52 %     LYM% 38 %     MON% 8 %     EOS% 2 %     BASO% 0 %     SHYLA ABS 3.7 K/mcL  1.8-7.7   LYM ABS 2.7 K/mcL  1.0-4.8   MON ABS 0.6 K/mcL  0.3-0.9   EOS ABS 0.2 K/mcL  0.1-0.5   BASO ABS 0.0 K/mcL  0.0-0.3   DIFF TYPE      AUTOMATED DIFFERENTIAL   NRBC 0 /100 WBC  0   PERCENT IMMATURE GRANULOCYTES 0 %     ABSOLUTE IMMATURE GRANULOCYTES 0.0 K/mcL  0-0.2     COMP METABOLIC PNL [id number] 11:40AM NIKOLAI PACHECO   [Aug 20, 2018 7:59AM NIKOLAI PACHECO]  Notify the patient that all of his lab work was completely normal which is excellent. Okay to provide copy if patient desires     Test Name Result Flag Reference   SODIUM 142 mmol/L  135-145   POTASSIUM 4.4 mmol/L  3.4-5.1   CHLORIDE 105 mmol/L     CARBON DIOXIDE 28 mmol/L  21-32   ANION GAP 13 mmol/L  10-20   GLUCOSE 85 mg/dl  65-99   BUN 15 mg/dl  6-20   CREATININE 1.06 mg/dl  0.67-1.17   GFR EST.AFRICAN AMER >90     eGFR results = or >90 mL/min/1.73m2 = Normal kidney function.   GFR EST.NONAFRI AMER 89     eGFR 60 - 89 mL/min/1.73m2 = Mild decrease in kidney function.   BUN/CREATININE RATIO 14  7-25   BILIRUBIN TOTAL 0.3 mg/dl  0.2-1.0   GOT/AST 16 Units/L  <38   ALKALINE PHOSPHATASE 69 Units/L     ALBUMIN 4.4 g/dl  3.6-5.1   TOTAL PROTEIN  7.3 g/dl  6.4-8.2   GLOBULIN (CALCULATED) 2.9 g/dl  2.0-4.0   A/G RATIO 1.5  1.0-2.4   CALCIUM 9.2 mg/dl  8.4-10.2   GPT/ALT 22 Units/L  <79   FASTING STATUS UNKNOWN hrs       LIPID PNL [id number] 11:40AM NIKOLAI PACHECO   [Aug 20, 2018 7:59AM NIKOLAI PACHECO]  Notify the patient that all of his lab work was completely normal which is excellent. Okay to provide copy if patient desires     Test Name Result Flag Reference   FASTING STATUS UNKNOWN hrs     CHOLESTEROL 150 mg/dl  <200   Desirable            <200  Borderline High      200 to 239  High                 >=240   HDL CHOLESTEROL 55 mg/dl  >39   Low            <40  Borderline Low 40 to 49  Near Optimal   50 to 59  Optimal        >=60   TRIGLYCERIDES 72 mg/dl  <150   Normal                   <150  Borderline High          150 to 199  High                     200 to 499  Very High                >=500   LDL CHOLESTEROL (CALCULATED) 81 mg/dl  <130   OPTIMAL               <100  NEAR OPTIMAL          100-129  BORDERLINE HIGH       130-159  HIGH                  160-189  VERY HIGH             >=190   NON-HDL CHOLESTEROL 95 mg/dl     Therapeutic Target:  CHD and risk equivalents <130  Multiple risk factors    <160  0 to 1 risk factors      <190   CHOLESTEROL/HDL RATIO 2.7  <4.5     URINALYSIS WITH MICROSCOPIC EXAM W/O C/S [id number] 11:40AM NIKOLAI PACHECO   [Aug 20, 2018 7:59AM NIKOLAI PACHECO]  Notify the patient that all of his lab work was completely normal which is excellent. Okay to provide copy if patient desires     Test Name Result Flag Reference   URINE COLOR YELLOW  YELLOW   APPEARANCE, URINE CLEAR     URINE GLUCOSE NEGATIVE mg/dl  NEGATIVE   URINE BILIRUBIN NEGATIVE  NEGATIVE   KETONES NEGATIVE mg/dl  NEGATIVE   URINE SPECIFIC GRAVITY 1.017  1.005-1.030   RBC-URINE NEGATIVE  NEGATIVE   PH-URINE 8.0 Units H 5.0-7.0   URINE PROTEIN NEGATIVE mg/dl  NEGATIVE   UROBILINOGEN-URINE 0.2 mg/dl  0.0-1.0   NITRITE NEGATIVE  NEGATIVE   WBC-URINE NEGATIVE  NEGATIVE  partner in discharge planning  - Arrange for needed discharge resources and transportation as appropriate  - Identify discharge learning needs (meds, wound care, etc)  - Arrange for interpreters to assist at discharge as needed  - Consider post-discharge preferences of patient/family/discharge partner  - Complete POLST form as appropriate  - Assess patient's ability to be responsible for managing their own health  - Refer to Case Management Department for coordinating discharge planning if the patient needs post-hospital services based on physician/LIP order or complex needs related to functional status, cognitive ability or social support system  Outcome: Progressing        SQUAMOUS EPITHELIAL CELLS NONE SEEN /HPF  0-5   ERYTHROCYTES NONE SEEN /HPF  0-3   LEUKOCYTES 1 to 5 /HPF  0-5   BACTERIA NONE SEEN /HPF  NONE SEEN   HYALINE CASTS NONE SEEN /LPF  0-5   SPECIMEN TYPE      URINE, CLEAN CATCH/MIDSTREAM

## 2024-02-26 NOTE — PAYOR COMM NOTE
--------------  DISCHARGE REVIEW    Payor: [employer]  Subscriber #:  [id number]  Authorization Number: [id number]    Admit date: 2/19/24  Admit time:   9:04 PM  Discharge Date: 2/23/2024  2:11 PM     Admitting Physician: Albert Andino MD  Attending Physician:  No att. providers found  Primary Care Physician: Sonja Soto MD          Discharge Summary Notes        Discharge Summary signed by Osbaldo Cordova MD at 2/23/2024 10:52 AM       Author: Osbaldo Cordova MD Specialty: HOSPITALIST, Internal Medicine Author Type: Physician    Filed: 2/23/2024 10:52 AM Date of Service: 2/23/2024 10:52 AM Status: Addendum    : Osbaldo Cordova MD (Physician)    Related Notes: Original Note by Osbaldo Cordova MD (Physician) filed at 2/22/2024 11:06 AM                                                              General Medicine Discharge Summary     Patient ID:  Jason Chong  43 year old  12/5/1980    Admit date: 2/19/2024    Discharge date and time: No discharge date for patient encounter.     Attending Physician: Osbaldo Cordova MD     Primary Care Physician: Sonja Soto MD     Discharge Diagnoses: Diverticulitis with abscess  Hematuria  Pulmonary nodule    Primary Diagnosis at discharge from Hospital: Other: acute diverticulitis w absces; still recommend for TCM follow-up    Please note that only [facility] DMG and [facility]G patients enrolled in the Medicare ACO, [facility] ACO and [facility] HMOs will be handled by the [facility] Care Management team.  For all other patients, please follow usual protocol for discharge care transition.    Secondary Diagnoses: None    Risk of readmission: Jason Chong has Moderate Risk of readmission after discharge from the hospital.    Discharge Condition: stable    Disposition: home    Important Follow up:  - PCP within   - Consults:     No follow-up provider specified.  - Labs: none  - Radiology: none    Hospital Course:      Pt admitted w divertuclitis w abscess  formation.  Too small to drain-intramural.  Improved w IV abx, plan dc home w po abx    Also w intermittent mild hematuria- plan repeat UA 2 weeks. Fu  if persists.      Counseled on low fiber diet.        Consults: IP CONSULT TO GENERAL SURGERY  IP CONSULT TO FOOD AND NUTRITION SERVICES    Operative Procedures:        Patient instructions:      There are no discharge medications for this patient.      I PERSONALLY RECONCILED CURRENT AND DISCHARGE MEDICATIONS ON DAY OF DISCHARGE      Activity: activity as tolerated  Diet:  low fiber   Wound Care: as directed  Code Status: No Order      Exam on day of discharge:     Vitals:    02/23/24 0830   BP: 132/80   Pulse: 58   Resp: 18   Temp: 98.1 °F (36.7 °C)       General: no acute distress, alert and oriented x 3  Heart: RRR  Lungs: clear bilaterally, no active wheezing  Abdomen: nontender, nondistended, intact BS  Extremities: no pedal edema   Neuro: CN inact, no focal deficits      Total time coordinating care for discharge: Greater than 30 minutes    .Nito Cordova  [facility] Hospitalist  [phone]      Electronically signed by Osbaldo Cordova MD on 2/23/2024 10:52 AM